# Patient Record
Sex: FEMALE | Race: WHITE | NOT HISPANIC OR LATINO | Employment: OTHER | URBAN - METROPOLITAN AREA
[De-identification: names, ages, dates, MRNs, and addresses within clinical notes are randomized per-mention and may not be internally consistent; named-entity substitution may affect disease eponyms.]

---

## 2020-10-23 NOTE — PATIENT DISCUSSION
[facility] Visual Field 36 point screen: I have reviewed the visual fields both taped and untaped done by Coleman Veloz on this patient which demonstrate significant obstruction of the patient's peripheral visual field on both eyes.

## 2020-12-22 NOTE — PATIENT DISCUSSION
Patient is here for suture removal.  The patient has a normal amount of bruising and swelling consistent with the post operative course. The suture lines are intact, there is no evidence of infection. The plan is to remove the sutures today with an expectation of normal healing. The post op course has been further reviewed with the patient.

## 2020-12-22 NOTE — PATIENT DISCUSSION
Discussed skin care and sun avoidance at length. Sunscreen given. Follow up in one month for continued observation.

## 2021-01-07 NOTE — PATIENT DISCUSSION
Patient continues to heal well following Levator Advancement upper eyelids and Lower Eyelid Blepharoplasty w/ CO2 laser resurfacing. The patient saw a dark lump on the right lower eyelid at the beginning of the week that is absent on today's exam, she will follow up at her scheduled post op appointment for continued observation.

## 2022-02-10 ENCOUNTER — NEW PATIENT (OUTPATIENT)
Dept: URBAN - METROPOLITAN AREA CLINIC 32 | Facility: CLINIC | Age: 67
End: 2022-02-10

## 2022-02-10 DIAGNOSIS — H10.13: ICD-10-CM

## 2022-02-10 DIAGNOSIS — H25.13: ICD-10-CM

## 2022-02-10 DIAGNOSIS — H16.223: ICD-10-CM

## 2022-02-10 PROCEDURE — 92015 DETERMINE REFRACTIVE STATE: CPT

## 2022-02-10 PROCEDURE — 92004 COMPRE OPH EXAM NEW PT 1/>: CPT

## 2022-02-10 RX ORDER — CYCLOSPORINE 0 MG/ML
1 SOLUTION/ DROPS OPHTHALMIC; TOPICAL TWICE A DAY
Start: 2022-02-10

## 2022-02-10 RX ORDER — LOTEPREDNOL ETABONATE 2 MG/ML
1 SUSPENSION/ DROPS OPHTHALMIC TWICE A DAY
Start: 2022-02-10

## 2022-02-10 ASSESSMENT — VISUAL ACUITY
OS_CC: 20/30+2
OD_CC: 20/40+2
OS_CC: J2
OS_SC: J10
OD_SC: 20/40+2
OS_SC: 20/40+2
OD_SC: J16
OS_GLARE: 20/80
OD_GLARE: 20/80
OD_CC: J1

## 2022-02-10 ASSESSMENT — KERATOMETRY
OS_AXISANGLE2_DEGREES: 40
OD_AXISANGLE2_DEGREES: 85
OD_K1POWER_DIOPTERS: 42.50
OD_AXISANGLE_DEGREES: 175
OS_K1POWER_DIOPTERS: 42.75
OS_AXISANGLE_DEGREES: 130
OD_K2POWER_DIOPTERS: 42.25
OS_K2POWER_DIOPTERS: 42.50

## 2022-02-10 ASSESSMENT — TONOMETRY
OD_IOP_MMHG: 20
OS_IOP_MMHG: 20

## 2022-02-10 NOTE — PATIENT DISCUSSION
Explained allergy. Pt states has tried OTC Zaditor and Pataday, but did not improve symptoms. Discussed option of trying Bepreve but likely expensive for pt.

## 2022-02-10 NOTE — PATIENT DISCUSSION
Discussed option of trying Restasis, Xiidra, or Cequa after improves symptoms with round of Alrex. Explained Clare Langleys does not work for everyone and takes at least 6 weeks of use before Pt will notice if starts to improves symptoms. Advised will still need to use ATs while using Cequa. E-Rx sent to [facility] and Pt given pharmacy information.

## 2022-06-04 ENCOUNTER — TELEPHONE ENCOUNTER (OUTPATIENT)
Dept: URBAN - METROPOLITAN AREA CLINIC 68 | Facility: CLINIC | Age: 67
End: 2022-06-04

## 2022-06-04 RX ORDER — SODIUM SULFATE, POTASSIUM SULFATE, MAGNESIUM SULFATE 17.5; 3.13; 1.6 G/ML; G/ML; G/ML
SOLUTION, CONCENTRATE ORAL AS DIRECTED
Qty: 1 | Refills: 0 | OUTPATIENT
Start: 2019-11-06 | End: 2019-11-07

## 2022-06-05 ENCOUNTER — TELEPHONE ENCOUNTER (OUTPATIENT)
Dept: URBAN - METROPOLITAN AREA CLINIC 68 | Facility: CLINIC | Age: 67
End: 2022-06-05

## 2022-06-05 RX ORDER — CHOLECALCIFEROL (VITAMIN D3) 50 MCG
D3 DOTS( 2000UNIT ORAL  DAILY ) ACTIVE -HX ENTRY TABLET ORAL DAILY
Status: ACTIVE | COMMUNITY
Start: 2019-11-06

## 2022-06-05 RX ORDER — CYANOCOBALAMIN (VITAMIN B-12) 5000 MCG
VITAMIN B-12( 5000MCG ORAL  DAILY ) ACTIVE -HX ENTRY TABLET,DISINTEGRATING ORAL DAILY
Status: ACTIVE | COMMUNITY
Start: 2019-11-06

## 2022-06-05 RX ORDER — AMLODIPINE BESYLATE 5 MG/1
AMLODIPINE BESYLATE( 5MG ORAL  TWO TIMES DAILY ) ACTIVE -HX ENTRY TABLET ORAL
Status: ACTIVE | COMMUNITY
Start: 2019-11-06

## 2022-06-05 RX ORDER — CHROMIUM 200 MCG
ZINC( 50MG ORAL  DAILY ) ACTIVE -HX ENTRY TABLET ORAL DAILY
Status: ACTIVE | COMMUNITY
Start: 2019-11-06

## 2022-06-05 RX ORDER — LEVOTHYROXINE SODIUM 137 UG/1
SYNTHROID( 137MCG ORAL  DAILY ) ACTIVE -HX ENTRY TABLET ORAL DAILY
Status: ACTIVE | COMMUNITY
Start: 2019-11-06

## 2022-06-05 RX ORDER — OLOPATADINE HYDROCHLORIDE 7 MG/ML
PAZEO( 0.7% OPHTHALMIC  AS DIRECTED ) ACTIVE -HX ENTRY SOLUTION OPHTHALMIC AS DIRECTED
Status: ACTIVE | COMMUNITY
Start: 2019-11-06

## 2022-06-05 RX ORDER — CONJUGATED ESTROGENS 0.62 MG/G
PREMARIN( 0.625MG/GM VAGINAL  AS DIRECTED ) ACTIVE -HX ENTRY CREAM VAGINAL AS DIRECTED
Status: ACTIVE | COMMUNITY
Start: 2019-11-06

## 2022-06-05 RX ORDER — UBIDECARENONE 30 MG
MAGNESIUM( 250MG ORAL  DAILY ) ACTIVE -HX ENTRY CAPSULE ORAL DAILY
Status: ACTIVE | COMMUNITY
Start: 2019-11-06

## 2022-06-05 RX ORDER — OMEPRAZOLE 20 MG/1
OMEPRAZOLE( 20MG ORAL  DAILY ) ACTIVE -HX ENTRY CAPSULE, DELAYED RELEASE ORAL DAILY
Status: ACTIVE | COMMUNITY
Start: 2019-11-06

## 2022-06-05 RX ORDER — COLCHICINE 0.6 MG/1
COLCRYS( 0.6MG ORAL  AS NEEDED ) ACTIVE -HX ENTRY TABLET, FILM COATED ORAL AS NEEDED
Status: ACTIVE | COMMUNITY
Start: 2019-11-06

## 2022-06-05 RX ORDER — MULTIVIT-MIN/FOLIC/VIT K/LYCOP 400-300MCG
VITAMIN C( 1000MG ORAL  DAILY ) ACTIVE -HX ENTRY TABLET ORAL DAILY
Status: ACTIVE | COMMUNITY
Start: 2019-11-06

## 2022-06-05 RX ORDER — ALPRAZOLAM 0.25 MG
XANAX( 0.25MG ORAL  AS NEEDED ) ACTIVE -HX ENTRY TABLET ORAL AS NEEDED
Status: ACTIVE | COMMUNITY
Start: 2019-11-06

## 2022-06-05 RX ORDER — ENALAPRIL MALEATE 20 MG/1
ENALAPRIL MALEATE( 20MG ORAL 1 TWO TIMES DAILY ) ACTIVE -HX ENTRY TABLET ORAL
Status: ACTIVE | COMMUNITY
Start: 2019-11-06

## 2022-06-25 ENCOUNTER — TELEPHONE ENCOUNTER (OUTPATIENT)
Age: 67
End: 2022-06-25

## 2022-06-25 RX ORDER — SODIUM SULFATE, POTASSIUM SULFATE, MAGNESIUM SULFATE 17.5; 3.13; 1.6 G/ML; G/ML; G/ML
SOLUTION, CONCENTRATE ORAL AS DIRECTED
Qty: 1 | Refills: 0 | OUTPATIENT
Start: 2019-11-06 | End: 2019-11-07

## 2022-06-26 ENCOUNTER — TELEPHONE ENCOUNTER (OUTPATIENT)
Age: 67
End: 2022-06-26

## 2022-06-26 RX ORDER — ALPRAZOLAM 0.25 MG
XANAX( 0.25MG ORAL  AS NEEDED ) ACTIVE -HX ENTRY TABLET ORAL AS NEEDED
Status: ACTIVE | COMMUNITY
Start: 2019-11-06

## 2022-06-26 RX ORDER — OLOPATADINE HYDROCHLORIDE 1 MG/ML
PATANOL( 0.1% OPHTHALMIC  AS DIRECTED ) ACTIVE -HX ENTRY SOLUTION/ DROPS OPHTHALMIC AS DIRECTED
Status: ACTIVE | COMMUNITY
Start: 2019-11-06

## 2022-06-26 RX ORDER — CYANOCOBALAMIN (VITAMIN B-12) 5000 MCG
VITAMIN B-12( 5000MCG ORAL  DAILY ) ACTIVE -HX ENTRY TABLET,DISINTEGRATING ORAL DAILY
Status: ACTIVE | COMMUNITY
Start: 2019-11-06

## 2022-06-26 RX ORDER — OMEPRAZOLE 20 MG/1
OMEPRAZOLE( 20MG ORAL  DAILY ) ACTIVE -HX ENTRY CAPSULE, DELAYED RELEASE ORAL DAILY
Status: ACTIVE | COMMUNITY
Start: 2019-11-06

## 2022-06-26 RX ORDER — ENALAPRIL MALEATE 20 MG/1
ENALAPRIL MALEATE( 20MG ORAL 1 TWO TIMES DAILY ) ACTIVE -HX ENTRY TABLET ORAL
Status: ACTIVE | COMMUNITY
Start: 2019-11-06

## 2022-06-26 RX ORDER — COLD-HOT PACK
ZINC( 50MG ORAL  DAILY ) ACTIVE -HX ENTRY EACH MISCELLANEOUS DAILY
Status: ACTIVE | COMMUNITY
Start: 2019-11-06

## 2022-06-26 RX ORDER — AMLODIPINE BESYLATE 5 MG/1
AMLODIPINE BESYLATE( 5MG ORAL  TWO TIMES DAILY ) ACTIVE -HX ENTRY TABLET ORAL
Status: ACTIVE | COMMUNITY
Start: 2019-11-06

## 2022-06-26 RX ORDER — CONJUGATED ESTROGENS 0.62 MG/G
PREMARIN( 0.625MG/GM VAGINAL  AS DIRECTED ) ACTIVE -HX ENTRY CREAM VAGINAL AS DIRECTED
Status: ACTIVE | COMMUNITY
Start: 2019-11-06

## 2022-06-26 RX ORDER — MULTIVIT-MIN/FOLIC/VIT K/LYCOP 400-300MCG
MULTIVITAL(  ORAL  DAILY ) ACTIVE -HX ENTRY TABLET ORAL DAILY
Status: ACTIVE | COMMUNITY
Start: 2019-11-06

## 2022-06-26 RX ORDER — LEVOTHYROXINE SODIUM 137 UG/1
SYNTHROID( 137MCG ORAL  DAILY ) ACTIVE -HX ENTRY TABLET ORAL DAILY
Status: ACTIVE | COMMUNITY
Start: 2019-11-06

## 2022-06-26 RX ORDER — CALCIUM CARBONATE/VITAMIN D3 600 MG-10
CALCIUM-VITAMIN D( 600MG ORAL  DAILY ) ACTIVE -HX ENTRY TABLET ORAL DAILY
Status: ACTIVE | COMMUNITY
Start: 2019-11-06

## 2022-06-26 RX ORDER — COLCHICINE 0.6 MG/1
COLCRYS( 0.6MG ORAL  AS NEEDED ) ACTIVE -HX ENTRY TABLET, FILM COATED ORAL AS NEEDED
Status: ACTIVE | COMMUNITY
Start: 2019-11-06

## 2022-06-26 RX ORDER — MULTIVIT-MIN/FOLIC/VIT K/LYCOP 400-300MCG
VITAMIN C( 1000MG ORAL  DAILY ) ACTIVE -HX ENTRY TABLET ORAL DAILY
Status: ACTIVE | COMMUNITY
Start: 2019-11-06

## 2022-08-09 ENCOUNTER — EMERGENCY VISIT (OUTPATIENT)
Dept: URBAN - METROPOLITAN AREA CLINIC 32 | Facility: CLINIC | Age: 67
End: 2022-08-09

## 2022-08-09 DIAGNOSIS — H16.223: ICD-10-CM

## 2022-08-09 DIAGNOSIS — G43.109: ICD-10-CM

## 2022-08-09 DIAGNOSIS — H53.8: ICD-10-CM

## 2022-08-09 PROCEDURE — 92012 INTRM OPH EXAM EST PATIENT: CPT

## 2022-08-09 ASSESSMENT — VISUAL ACUITY
OS_SC: 20/30-2
OD_SC: 20/40-2
OD_PH: 20/20-2

## 2022-08-09 ASSESSMENT — KERATOMETRY
OS_AXISANGLE2_DEGREES: 40
OD_AXISANGLE_DEGREES: 175
OD_K2POWER_DIOPTERS: 42.25
OS_AXISANGLE_DEGREES: 130
OD_K1POWER_DIOPTERS: 42.50
OS_K2POWER_DIOPTERS: 42.50
OD_AXISANGLE2_DEGREES: 85
OS_K1POWER_DIOPTERS: 42.75

## 2022-08-09 ASSESSMENT — TONOMETRY
OS_IOP_MMHG: 19
OD_IOP_MMHG: 19

## 2024-06-04 ENCOUNTER — COMPREHENSIVE EXAM (OUTPATIENT)
Dept: URBAN - METROPOLITAN AREA CLINIC 32 | Facility: CLINIC | Age: 69
End: 2024-06-04

## 2024-06-04 DIAGNOSIS — H10.13: ICD-10-CM

## 2024-06-04 DIAGNOSIS — H25.13: ICD-10-CM

## 2024-06-04 DIAGNOSIS — H16.223: ICD-10-CM

## 2024-06-04 PROCEDURE — 99214 OFFICE O/P EST MOD 30 MIN: CPT

## 2024-06-04 PROCEDURE — 92015 DETERMINE REFRACTIVE STATE: CPT

## 2024-06-04 ASSESSMENT — VISUAL ACUITY
OS_SC: 20/40
OS_CC: 20/30
OS_CC: J1
OD_CC: J1
OS_GLARE: 20/40
OD_GLARE: 20/40
OD_SC: 20/50
OD_CC: 20/40

## 2024-06-04 ASSESSMENT — KERATOMETRY
OD_K2POWER_DIOPTERS: 42.25
OS_K2POWER_DIOPTERS: 42.25
OS_K1POWER_DIOPTERS: 42.75
OD_K1POWER_DIOPTERS: 42.75
OD_AXISANGLE_DEGREES: 165
OS_AXISANGLE2_DEGREES: 48
OD_AXISANGLE2_DEGREES: 75
OS_AXISANGLE_DEGREES: 138

## 2024-06-04 ASSESSMENT — TONOMETRY
OD_IOP_MMHG: 18
OS_IOP_MMHG: 19

## 2025-06-24 ENCOUNTER — COMPREHENSIVE EXAM (OUTPATIENT)
Age: 70
End: 2025-06-24

## 2025-06-24 DIAGNOSIS — H10.13: ICD-10-CM

## 2025-06-24 DIAGNOSIS — H16.223: ICD-10-CM

## 2025-06-24 DIAGNOSIS — H25.13: ICD-10-CM

## 2025-06-24 PROCEDURE — 92015 DETERMINE REFRACTIVE STATE: CPT

## 2025-06-24 PROCEDURE — 99214 OFFICE O/P EST MOD 30 MIN: CPT
